# Patient Record
Sex: MALE | URBAN - METROPOLITAN AREA
[De-identification: names, ages, dates, MRNs, and addresses within clinical notes are randomized per-mention and may not be internally consistent; named-entity substitution may affect disease eponyms.]

---

## 2018-12-12 ENCOUNTER — IMPORTED ENCOUNTER (OUTPATIENT)
Dept: URBAN - METROPOLITAN AREA CLINIC 1 | Facility: CLINIC | Age: 58
End: 2018-12-12

## 2018-12-12 PROBLEM — H52.12: Noted: 2018-12-12

## 2018-12-12 PROBLEM — H52.4: Noted: 2018-12-12

## 2018-12-12 PROCEDURE — 92004 COMPRE OPH EXAM NEW PT 1/>: CPT

## 2018-12-12 PROCEDURE — 92015 DETERMINE REFRACTIVE STATE: CPT

## 2018-12-12 NOTE — PATIENT DISCUSSION
1. Myopia OS -- Finalized Glasses MRx was given to patient today for correction if indicated and requested2. Presbyopia OU 3. Cataracts OU -- Observe 4. Dry Eyes w/ PEK OU -- Recommend the frequent use of OTC AT's BID-QID OU (Sample of Systane Complete Given). Patient was on Restasis but is out of it now. Will consider restarting Restasis / starting Yadi Rink if w/o improvement5. S/p [address] OU (2007 / Dr. Asa White) -- Intended MonoVA: OD- Distance / OS- Near 6. S/p RD Repair Sx OD (Dr. Jovanna Cervantes) Return for an appointment in 1 YR for a 36 OU with Dr. Virginia Vega. Return for an appointment in April 2019 for a 30 / Tear Lab OU with Dr. Virginia Vega.

## 2019-03-07 NOTE — PATIENT DISCUSSION
change CL Rx leave OD a bit more myopic for more monoV effect.  pt to call after trials to order if better for NVA (ed will hurt OD DVA a bit).

## 2022-04-03 ASSESSMENT — KERATOMETRY
OD_AXISANGLE_DEGREES: 147
OS_K1POWER_DIOPTERS: 41.75
OS_AXISANGLE2_DEGREES: 053
OD_K1POWER_DIOPTERS: 40.75
OD_K2POWER_DIOPTERS: 40.75
OS_K2POWER_DIOPTERS: 41.25
OD_AXISANGLE2_DEGREES: 057
OS_AXISANGLE_DEGREES: 143

## 2022-04-03 ASSESSMENT — TONOMETRY
OS_IOP_MMHG: 10
OD_IOP_MMHG: 10

## 2022-04-03 ASSESSMENT — VISUAL ACUITY
OD_SC: J10
OD_CC: 20/20
OS_SC: J2
OS_CC: 20/40